# Patient Record
Sex: FEMALE | Employment: UNEMPLOYED | URBAN - METROPOLITAN AREA
[De-identification: names, ages, dates, MRNs, and addresses within clinical notes are randomized per-mention and may not be internally consistent; named-entity substitution may affect disease eponyms.]

---

## 2018-03-22 ENCOUNTER — OFFICE VISIT (OUTPATIENT)
Dept: FAMILY MEDICINE CLINIC | Age: 28
End: 2018-03-22

## 2018-03-22 ENCOUNTER — HOSPITAL ENCOUNTER (OUTPATIENT)
Dept: LAB | Age: 28
Discharge: HOME | End: 2018-03-22

## 2018-03-22 VITALS
SYSTOLIC BLOOD PRESSURE: 104 MMHG | HEART RATE: 68 BPM | DIASTOLIC BLOOD PRESSURE: 70 MMHG | WEIGHT: 124 LBS | BODY MASS INDEX: 23.41 KG/M2 | HEIGHT: 61 IN | TEMPERATURE: 98.1 F | RESPIRATION RATE: 15 BRPM | OXYGEN SATURATION: 99 %

## 2018-03-22 DIAGNOSIS — E03.9 ACQUIRED HYPOTHYROIDISM: ICD-10-CM

## 2018-03-22 DIAGNOSIS — N92.6 IRREGULAR MENSES: ICD-10-CM

## 2018-03-22 DIAGNOSIS — E03.9 ACQUIRED HYPOTHYROIDISM: Primary | ICD-10-CM

## 2018-03-22 DIAGNOSIS — Z00.00 REGULAR CHECK-UP: ICD-10-CM

## 2018-03-22 LAB
BASOPHILS # BLD: 0 K/UL (ref 0–0.06)
BASOPHILS NFR BLD: 0 % (ref 0–2)
DIFFERENTIAL METHOD BLD: NORMAL
EOSINOPHIL # BLD: 0.1 K/UL (ref 0–0.4)
EOSINOPHIL NFR BLD: 1 % (ref 0–5)
ERYTHROCYTE [DISTWIDTH] IN BLOOD BY AUTOMATED COUNT: 13.3 % (ref 11.6–14.5)
HCT VFR BLD AUTO: 38.9 % (ref 35–45)
HGB BLD-MCNC: 12.5 G/DL (ref 12–16)
LYMPHOCYTES # BLD: 2.3 K/UL (ref 0.9–3.6)
LYMPHOCYTES NFR BLD: 28 % (ref 21–52)
MCH RBC QN AUTO: 28.2 PG (ref 24–34)
MCHC RBC AUTO-ENTMCNC: 32.1 G/DL (ref 31–37)
MCV RBC AUTO: 87.6 FL (ref 74–97)
MONOCYTES # BLD: 0.5 K/UL (ref 0.05–1.2)
MONOCYTES NFR BLD: 7 % (ref 3–10)
NEUTS SEG # BLD: 5 K/UL (ref 1.8–8)
NEUTS SEG NFR BLD: 64 % (ref 40–73)
PLATELET # BLD AUTO: 225 K/UL (ref 135–420)
PMV BLD AUTO: 11.4 FL (ref 9.2–11.8)
RBC # BLD AUTO: 4.44 M/UL (ref 4.2–5.3)
TSH SERPL DL<=0.05 MIU/L-ACNC: 2.92 UIU/ML (ref 0.36–3.74)
WBC # BLD AUTO: 7.9 K/UL (ref 4.6–13.2)

## 2018-03-22 PROCEDURE — 84443 ASSAY THYROID STIM HORMONE: CPT | Performed by: FAMILY MEDICINE

## 2018-03-22 PROCEDURE — 85025 COMPLETE CBC W/AUTO DIFF WBC: CPT | Performed by: FAMILY MEDICINE

## 2018-03-22 RX ORDER — LEVOTHYROXINE SODIUM 50 UG/1
TABLET ORAL
COMMUNITY

## 2018-03-22 NOTE — PROGRESS NOTES
Discharge instructions reviewed with patient    Medication list and understanding of medications reviewed with patient. OTC and herbal medications reviewed and added to med list if applicable  Barriers to adherence assessed. Guidance given regarding new medications this visit, including reason for taking this medicine, and common side effects. Labs drawn. Appointment for OB/GYN consult made. Interpretor Brayan Ronquillo MA utilized with this patient.

## 2018-03-22 NOTE — PROGRESS NOTES
YESICA  Zeenat Clifford is a 33 y.o. female being seen today for   Chief Complaint   Patient presents with    Menstrual Problem     Pt stated she has been on her menstrual cycle for 2 months. .  she states that she has menstural bleeding for about 2 months. She does have history of irregular menses but has never happened like this before. Some mild cramping. A little dizzy. Started menses at 16,  Was pregnant at 24 but had incomplete miscarriage, and had D and C and transfusion. Was on OCP breifly following her miscarriage, then stopped so she could get pregnant again. She did get pregnant and had a normal pregnancy and delivery. Now has a 3year old. Her last pelvic exam was after her son was born. Does have mild hypothyroidism [facility] [place] [facility]      Past Medical History:   Diagnosis Date    Acquired hypothyroidism 3/22/2018    History of blood transfusion     2011    History of D&C     2011         ROS  Patient states that she is feeling well. Denies complaints of chest pain, shortness of breath, swelling of legs. she denies nausea, vomiting or diarrhea. Current Outpatient Prescriptions   Medication Sig    levothyroxine (SYNTHROID) 50 mcg tablet Take  by mouth Daily (before breakfast).  desogestrel-ethinyl estradiol (DESOGEN) 0.15-30 mg-mcg per tablet Take 1 Tab by mouth daily.  norgestimate-ethinyl estradiol (SPRINTEC, 28,) 0.25-35 mg-mcg per tablet Take 1 Tab by mouth daily.  PNV Comb 46-Iron Cb-FA-DHA (PRENATAL PLUS DHA) 7-400-100 mg-mcg-mg Tab Take 1 Tab by mouth daily. No current facility-administered medications for this visit. PE  Visit Vitals    /70 (BP 1 Location: Left arm, BP Patient Position: Sitting)    Pulse 68    Temp 98.1 °F (36.7 °C) (Oral)    Resp 15    Ht 5' 1\" (1.549 m)    Wt 124 lb (56.2 kg)    LMP 03/22/2018    SpO2 99%    BMI 23.43 kg/m2        Alert and oriented with normal mood and affect.  she is well developed and well nourished . Lungs are clear without wheezing. Heart rate is regular without murmurs or gallops. There is no lower extremity edema. Results for orders placed or performed in visit on 11/14/12   CBC WITH AUTOMATED DIFF   Result Value Ref Range    WBC 7.3 4.0 - 10.5 x10E3/uL    RBC 4.53 3.77 - 5.28 x10E6/uL    HGB 12.1 11.1 - 15.9 g/dL    HCT 37.8 34.0 - 46.6 %    MCV 83 79 - 97 fL    MCH 26.7 26.6 - 33.0 pg    MCHC 32.0 31.5 - 35.7 g/dL    RDW 13.9 12.3 - 15.4 %    PLATELET 724 476 - 874 x10E3/uL    NEUTROPHILS 50 40 - 74 %    Lymphocytes 39 14 - 46 %    MONOCYTES 9 4 - 13 %    EOSINOPHILS 1 0 - 7 %    BASOPHILS 1 0 - 3 %    ABS. NEUTROPHILS 3.7 1.8 - 7.8 x10E3/uL    Abs Lymphocytes 2.8 0.7 - 4.5 x10E3/uL    ABS. MONOCYTES 0.7 0.1 - 1.0 x10E3/uL    ABS. EOSINOPHILS 0.1 0.0 - 0.4 x10E3/uL    ABS. BASOPHILS 0.0 0.0 - 0.2 x10E3/uL    IMMATURE GRANULOCYTES 0 0 - 2 %    ABS. IMM. GRANS. 0.0 0.0 - 0.1 x10E3/uL   TSH REFLEX TO T4   Result Value Ref Range    TSH 2.600 0.450 - 4.500 uIU/mL   AMB POC URINALYSIS DIP STICK MANUAL W/O MICRO   Result Value Ref Range    Color (UA POC) Yellow (none)    Clarity (UA POC) Clear (none)    Glucose (UA POC) Negative (none)    Bilirubin (UA POC) Negative (none)    Ketones (UA POC) Negative (none)    Specific gravity (UA POC) 6.500 (A) 1.001 - 1.035    Blood (UA POC) 2+ (none)    pH (UA POC) 1.015 (A) 4.6 - 8.0    Protein (UA POC) n Negative mg/dL    Urobilinogen (UA POC) 0.2 mg/dL 0.2 - 1    Nitrites (UA POC) Negative (none)    Leukocyte esterase (UA POC) Negative (none)         Assessment and Plan:        ICD-10-CM ICD-9-CM    1. Acquired hypothyroidism E03.9 244.9 TSH 3RD GENERATION   2. Irregular menses N92.6 626.4 CBC WITH AUTOMATED DIFF      AMB POC URINE PREGNANCY TEST, VISUAL COLOR COMPARISON   3. Regular check-up Z00.00 V70.0      Check labs today.  (upreg, TSH, CBC)    She likely needs to go back on OCP to regulate menses but pt is uncomfortable taking rx without pelvic exam and we do not have facility for exam today  Will refer back to gyn where she was in the past.   She is comfortable with that plan      Sidney Pisano MD

## 2018-03-23 ENCOUNTER — OFFICE VISIT (OUTPATIENT)
Dept: OBGYN CLINIC | Age: 28
End: 2018-03-23

## 2018-03-23 ENCOUNTER — HOSPITAL ENCOUNTER (OUTPATIENT)
Dept: LAB | Age: 28
Discharge: HOME | End: 2018-03-23

## 2018-03-23 VITALS
HEIGHT: 61 IN | BODY MASS INDEX: 23.79 KG/M2 | OXYGEN SATURATION: 100 % | DIASTOLIC BLOOD PRESSURE: 82 MMHG | SYSTOLIC BLOOD PRESSURE: 128 MMHG | RESPIRATION RATE: 18 BRPM | HEART RATE: 78 BPM | WEIGHT: 126 LBS

## 2018-03-23 DIAGNOSIS — N94.6 DYSMENORRHEA: ICD-10-CM

## 2018-03-23 DIAGNOSIS — N92.1 MENOMETRORRHAGIA: Primary | ICD-10-CM

## 2018-03-23 PROCEDURE — 84146 ASSAY OF PROLACTIN: CPT | Performed by: OBSTETRICS & GYNECOLOGY

## 2018-03-23 PROCEDURE — 36415 COLL VENOUS BLD VENIPUNCTURE: CPT | Performed by: OBSTETRICS & GYNECOLOGY

## 2018-03-23 PROCEDURE — 83001 ASSAY OF GONADOTROPIN (FSH): CPT | Performed by: OBSTETRICS & GYNECOLOGY

## 2018-03-23 PROCEDURE — 82670 ASSAY OF TOTAL ESTRADIOL: CPT | Performed by: OBSTETRICS & GYNECOLOGY

## 2018-03-23 NOTE — PROGRESS NOTES
Subjective:      Patient is Chinese speaking only and the visit was accomplished with the aid of a phone . Patient states that she has had frequent and irregular vaginal bleeding since a miscarriage 6 years ago, but that for the past 3 months she has had almost constant bleeding. She also complains of pain with the bleeding. She has had one other pregnancy with a vaginal delivery 3 years ago. She states that she is not currently sexually active. Current Outpatient Prescriptions   Medication Sig Dispense Refill    levothyroxine (SYNTHROID) 50 mcg tablet Take  by mouth Daily (before breakfast). No Known Allergies  Past Medical History:   Diagnosis Date    Acquired hypothyroidism 3/22/2018    History of blood transfusion     2011    History of D&C     2011     History reviewed. No pertinent surgical history. Family History   Problem Relation Age of Onset    Hypertension Mother      Social History   Substance Use Topics    Smoking status: Never Smoker    Smokeless tobacco: Never Used    Alcohol use No      Review of Systems    A comprehensive review of systems was negative except for that written in the HPI. Objective:     @IPVITALS(8:6,8,9,5,10)@  : Body mass index is 23.81 kg/(m^2).   Visit Vitals    /82 (BP 1 Location: Left arm, BP Patient Position: Sitting)    Pulse 78    Resp 18    Ht 5' 1\" (1.549 m)    Wt 126 lb (57.2 kg)    LMP 12/19/2017    SpO2 100%    BMI 23.81 kg/m2     General appearance: alert, cooperative, no distress, appears stated age  Head: Normocephalic, without obvious abnormality, atraumatic  Lungs: normal respiratory effort  Pelvic: External genitalia normal, Vagina normal without discharge, cervix normal in appearance, no CMT, uterus normal size, shape, and consistency, no adnexal masses or tenderness, rectovaginal septum normal  Extremities: extremities normal, atraumatic, no cyanosis or edema  Skin: Skin color, texture, turgor normal. No rashes or lesions  Neurologic: Grossly normal       Assessment/Plan:     Menometrorrhagia, dysmenorrhea. Pelvic ultrasound and labs pending. Follow up after ultrasound. Plan of care discussed. Patient expressed understanding.

## 2018-03-24 LAB — ESTRADIOL SERPL-MCNC: 43.2 PG/ML

## 2018-03-28 LAB
FSH SERPL-ACNC: 6 MIU/ML
LH SERPL-ACNC: 29.3 MIU/ML
PROLACTIN SERPL-MCNC: 13 NG/ML

## 2018-04-06 ENCOUNTER — HOSPITAL ENCOUNTER (OUTPATIENT)
Dept: ULTRASOUND IMAGING | Age: 28
Discharge: HOME | End: 2018-04-06
Attending: OBSTETRICS & GYNECOLOGY
Payer: SELF-PAY

## 2018-04-06 DIAGNOSIS — N92.1 MENOMETRORRHAGIA: ICD-10-CM

## 2018-04-06 DIAGNOSIS — N94.6 DYSMENORRHEA: ICD-10-CM

## 2018-04-06 PROCEDURE — 76830 TRANSVAGINAL US NON-OB: CPT

## 2018-04-19 ENCOUNTER — OFFICE VISIT (OUTPATIENT)
Dept: OBGYN CLINIC | Age: 28
End: 2018-04-19

## 2018-04-19 VITALS
OXYGEN SATURATION: 100 % | SYSTOLIC BLOOD PRESSURE: 119 MMHG | HEIGHT: 61 IN | DIASTOLIC BLOOD PRESSURE: 78 MMHG | BODY MASS INDEX: 24.17 KG/M2 | HEART RATE: 86 BPM | WEIGHT: 128 LBS | RESPIRATION RATE: 18 BRPM

## 2018-04-19 DIAGNOSIS — N92.1 MENOMETRORRHAGIA: Primary | ICD-10-CM

## 2018-04-19 NOTE — PROGRESS NOTES
Subjective:      Patient presents for follow up of labs and ultrasound done secondary to menometrorrhagia. She is Serbian speaking and the visit was accomplished with the aid of a phone . The patient states that she has had only 2-3 days without bleeding since her last visit, and that this is the pattern which has been present for the past few months. She also complains of hair loss. Current Outpatient Prescriptions   Medication Sig Dispense Refill    levothyroxine (SYNTHROID) 50 mcg tablet Take  by mouth Daily (before breakfast). No Known Allergies  Past Medical History:   Diagnosis Date    Acquired hypothyroidism 3/22/2018    History of blood transfusion     2011    History of D&C     2011     History reviewed. No pertinent surgical history. Family History   Problem Relation Age of Onset    Hypertension Mother      Social History   Substance Use Topics    Smoking status: Never Smoker    Smokeless tobacco: Never Used    Alcohol use No      Review of Systems    A comprehensive review of systems was negative except for that written in the HPI. Objective:     Visit Vitals    /78 (BP 1 Location: Left arm, BP Patient Position: Sitting)    Pulse 86    Resp 18    Ht 5' 1\" (1.549 m)    Wt 128 lb (58.1 kg)    LMP 03/22/2018    SpO2 100%    BMI 24.19 kg/m2     General appearance: alert, cooperative, no distress, appears stated age  Exam deferred    Pelvic ultrasound and labs reviewed: FSH, LH, estradiol, and prolactin all normal. TSH drawn by another provider and was normal. Ultrasound unremarkable. Assessment/Plan:     Menometrorrhagia. Recommended that the patient return for an endometrial biopsy. Discussed the option of symptomatic treatment with contraceptives, and the patient states that she would rather continue to observe for now. Follow up for procedure. Plan of care discussed. Patient expressed understanding.     The entirety of the 20 minute visit was spent in counseling.

## 2018-05-04 ENCOUNTER — OFFICE VISIT (OUTPATIENT)
Dept: OBGYN CLINIC | Age: 28
End: 2018-05-04

## 2018-05-04 ENCOUNTER — HOSPITAL ENCOUNTER (OUTPATIENT)
Dept: LAB | Age: 28
Discharge: HOME | End: 2018-05-04
Payer: SELF-PAY

## 2018-05-04 VITALS
HEIGHT: 64 IN | DIASTOLIC BLOOD PRESSURE: 61 MMHG | HEART RATE: 77 BPM | BODY MASS INDEX: 21.51 KG/M2 | WEIGHT: 126 LBS | RESPIRATION RATE: 18 BRPM | SYSTOLIC BLOOD PRESSURE: 112 MMHG

## 2018-05-04 DIAGNOSIS — N92.1 MENOMETRORRHAGIA: Primary | ICD-10-CM

## 2018-05-04 LAB
HCG URINE, QL. (POC): NEGATIVE
VALID INTERNAL CONTROL?: YES

## 2018-05-04 PROCEDURE — 88305 TISSUE EXAM BY PATHOLOGIST: CPT | Performed by: OBSTETRICS & GYNECOLOGY

## 2018-05-04 NOTE — PROGRESS NOTES
Patient here for EMB only secondary to prolonged vaginal bleeding following amenorrhea. Visit was accomplished with the aid of a phone . She states that her bleeding has now resolved. See procedure note.

## 2018-05-04 NOTE — PROCEDURES
[facility] OB/GYN  OFFICE PROCEDURE PROGRESS NOTE        Chart reviewed for the following:   Silke CRAWFORD DO, have reviewed the History, Physical and updated the Allergic reactions for Zeenat Kaye Camelia     TIME OUT performed immediately prior to start of procedure:   Silke CRAWFORD DO, have performed the following reviews on Zeenat Kaye Camelia prior to the start of the procedure:            * Patient was identified by name and date of birth   * Agreement on procedure being performed was verified  * Risks and Benefits explained to the patient  * Procedure site verified and marked as necessary  * Patient was positioned for comfort  * Consent was signed and verified     Time: 1130    Date of procedure: 5/4/2018    Procedure performed by:  Silke Sin DO    Provider assisted by: Elkin Arenas LPN    Patient assisted by: self    How tolerated by patient: tolerated the procedure well with no complications    Post Procedural Pain Scale: not reported    Comments: none    Endometrial Biopsy Procedure Note    Endometrial biopsy was performed today. Indications: abnormal uterine bleeding    Procedure Details   Urine pregnancy test was done and result is negative. The risks (including infection, bleeding, pain, and uterine perforation) and benefits of the procedure were explained to the her and written informed consent was obtained. Antibiotic prophylaxis against endocarditis was not indicated. She was placed in the dorsal lithotomy position. Bimanual exam showed the uterus to be in the neutral position. A speculum inserted in the vagina, and the cervix prepped with povidone iodine. A \"Pipelle endometrial aspirator\" was used to sample the endometrium. Sample was sent for pathologic examination. The patient tolerated the procedure well and there were no complications. Plan:  Ms. Shay Anguiano was advised to call for any fever or for prolonged or severe pain or bleeding.  She was advised to use OTC ibuprofen as needed for mild to moderate pain. She was advised to avoid vaginal intercourse for 48 hours or until the bleeding has completely stopped. My office will get in touch with her as soon as we have the pathology report.

## 2018-05-30 NOTE — PROGRESS NOTES
2nd call to patient, LMOVM, please call to discuss test results.  No additional details given  ltr also mtp

## 2018-08-24 ENCOUNTER — OFFICE VISIT (OUTPATIENT)
Dept: OBGYN CLINIC | Age: 28
End: 2018-08-24

## 2018-08-24 VITALS
SYSTOLIC BLOOD PRESSURE: 117 MMHG | DIASTOLIC BLOOD PRESSURE: 78 MMHG | WEIGHT: 128 LBS | HEIGHT: 64 IN | RESPIRATION RATE: 18 BRPM | BODY MASS INDEX: 21.85 KG/M2 | HEART RATE: 85 BPM | OXYGEN SATURATION: 98 %

## 2018-08-24 DIAGNOSIS — N92.1 MENOMETRORRHAGIA: Primary | ICD-10-CM

## 2018-08-24 RX ORDER — NORGESTIMATE AND ETHINYL ESTRADIOL 0.25-0.035
1 KIT ORAL DAILY
Qty: 3 PACKAGE | Refills: 3 | Status: SHIPPED | OUTPATIENT
Start: 2018-08-24

## 2018-08-24 NOTE — PROGRESS NOTES
Subjective:      Patient is Chinese speaking only and the visit was accomplished with the aid of a phone . Patient presents to review the results of her EMB and ultrasound. She also complains of continued irregular bleeding, with some bleeding present on about half of the days. She says that this is unchanged from her previous pattern. Current Outpatient Prescriptions   Medication Sig Dispense Refill    norgestimate-ethinyl estradiol (ORTHO-CYCLEN, SPRINTEC) 0.25-35 mg-mcg tab Take 1 Tab by mouth daily. 3 Package 3    levothyroxine (SYNTHROID) 50 mcg tablet Take  by mouth Daily (before breakfast). No Known Allergies  Past Medical History:   Diagnosis Date    Acquired hypothyroidism 3/22/2018    History of blood transfusion     2011    History of D&C     2011     History reviewed. No pertinent surgical history. Family History   Problem Relation Age of Onset    Hypertension Mother      Social History   Substance Use Topics    Smoking status: Never Smoker    Smokeless tobacco: Never Used    Alcohol use No      Review of Systems    A comprehensive review of systems was negative except for that written in the HPI. Objective:     Visit Vitals    /78    Pulse 85    Resp 18    Ht 5' 4\" (1.626 m)    Wt 128 lb (58.1 kg)    LMP 08/07/2018    SpO2 98%    BMI 21.97 kg/m2     General appearance: alert, cooperative, no distress, appears stated age  Exam deferred. EMB and pelvic ultrasound reviewed and discussed:  EMB benign  Ultrasound normal with the exception of multiple peripheral ovarian follicles bilaterally. Assessment/Plan:     Menometrorrhagia. Discussed with the patient that her ultrasound is suspicious for PCOS, though she does not meet the full diagnostic criteria. Discussed with her that all other test have been normal including Pap and labs.  Discussed that the first-line treatment for her bleeding is the use of a contraceptive to regulate her bleeding, and she agrees to try OCPs. Rx written and instructions given. Follow up prn. Plan of care discussed. Patient expressed understanding. The entirety of the 30 minute visit was spent in counseling.

## 2018-08-24 NOTE — MR AVS SNAPSHOT
[address] [place] [address] [id number] 
[phone] Patient: Alan Hernandez MRN: [id number] UTX:[id number] Visit Information La Menezes y Amauri Personal Médico Departamento Teléfono del Dep. Número de visita 8/24/2018  9:45 AM Karan Montes DO [facility] [phone] [phone] Upcoming Health Maintenance Date Due  
 PAP AKA CERVICAL CYTOLOGY 9/29/2014 Influenza Age 5 to Adult 8/1/2018 Alergias  Review Complete El: 5/4/2018 Por: Karan Montes DO A partir del:  8/24/2018 No Known Allergies Vacunas actuales Neema Maxon No hay ninguna vacuna archivada. No revisadas esta visita Partes vitales PS Pulso Resp Premier ( percentil de crecimiento) Peso (percentil de crecimiento) LMP (última kusum)  
 117/78 85 18 5' 4\" (1.626 m) 128 lb (58.1 kg) 08/07/2018 SpO2 BMI ([facility]) Estado obstétrico Estatus de tabaquísmo 98% 21.97 kg/m2 Unknown Never Smoker Historial de signos vitales BMI and BSA Data Body Mass Index Body Surface Area  
 21.97 kg/m 2 1.62 m 2 [facility] Name Phone [employer] [address] [phone] Puga lista de medicamentos actualizada Andreas Alfonso actualizada 8/24/18 10:32 AM.  Elbridge Moises use puga lista de medicamentos más reciente. levothyroxine 50 mcg tablet También conocido krystal:  SYNTHROID Take  by mouth Daily (before breakfast). Instrucciones para el Pac[place] Síndrome de ovario poliquístico: Instrucciones de cuidado - [ Polycystic Ovary Syndrome: Care Instructions ] Instrucciones de cuidado El síndrome de ovario poliquístico (PCOS, por alberto siglas en inglés) significa que las hormonas de luana tomi están desequilibradas. Puede causar problemas con alberto períodos y hacer que sea difícil Raheem Blinks. Los médicos no saben con seguridad qué causa el PCOS, aunque parece ser hereditario. También parece estar relacionado con la obesidad y el riesgo de diabetes. Si usted tiene PCOS, alberto hermanas e hijas tienen un mayor riesgo de tenerlo [place]. Puede tener otros síntomas. Estos incluyen aumento de [place], acné, crecimiento excesivo de vello en la ino o el cuerpo, presión arterial rosina y azúcar alto en la bryan. Alberto ovarios pueden tener quistes. Estos quistes son crecimientos llenos de líquido. Tenga en cuenta que aunque alberto períodos menstruales maryse irregulares, de todos modos, podría quedar [place]. Hable con bill médico acerca de métodos anticonceptivos si no quiere quedar embarazada. En ocasiones, los cambios hormonales que se manifiestan con el PCOS también pueden dificultar que algunas mujeres queden [place]. Si esto le preocupa, hable con bill médico acerca de un tratamiento para sherie problema. Las mujeres que tienen PCOS pueden pasar meses o incluso más tiempo sin períodos. Es posible que bill médico le recomiende [employer] que pueden ayudar a regularizar alberto ciclos. La atención de seguimiento es luana parte clave de bill tratamiento y seguridad. Asegúrese de hacer y acudir a todas las citas, y llame a bill médico si está teniendo problemas. También es luana buena idea saber los resultados de alberto exámenes y mantener luana lista de los medicamentos que dhara. Cómo puede cuidarse en el hogar? · [employer] medicamentos valdo krystal le fueron recetados. Llame a bill médico si puja estar teniendo un problema con bill medicamento. · Siga luana dieta saludable. Incluya en bill dieta diaria frutas, verduras, frijoles (habichuelas) y granos integrales. · Si tiene sobrepeso, adelgazar puede ayudar con muchos de los síntomas del PCOS. Consulte a bill médico sobre [address] seguras de bajar de [place].  
· Jose por lo menos 30 minutos de ejercicio la mayoría de los días de la semana. Caminar es luana buena opción. O puede correr, nadar, [employer], o jugar al tenis u otros deportes de equipo. · Para el vello indeseable, pruebe con decoloración, depilación, electrólisis o tratamiento con láser. · El acné puede tratarse con medicamentos de [employer]. Busque aquellos que contengan peróxido de benzoilo o ácido salicílico. 

Cuándo debes pedir ayuda? Vigila muy de cerca los cambios en tu darryl, y asegúrate de comunicarte con tu médico si: 
  · Tus síntomas Michelle Bold. Dónde puede encontrar más información en inglés? Patricia Tirado a [email or web address] Rosalynd Life D972 en la búsqueda para aprender más acerca de \"Síndrome de ovario poliquístico: Instrucciones de cuidado - [ Polycystic Ovary Syndrome: Care Instructions ]. \" 
Revisado: 6 octubre, 2017 Versión del contenido: 11.7 © 9996-5086 Healthwise, Incorporated. Las instrucciones de cuidado fueron adaptadas bajo licencia por [employer] (which disclaims liability or warranty for this information). Si usted tiene [place] afección médica o sobre estas instrucciones, siempre pregunte a bill profesional de darryl. Healthwise, Incorporated niega toda garantía o responsabilidad por bill uso de esta información. Introducing \[facility] [place] [facility]\"" & HEALTH SERVICES! [facility] introduce portal paciente MyChart . Ahora se puede acceder a partes de bill expediente médico, enviar por correo electrónico la oficina de bill médico y solicitar renovaciones de medicamentos en línea. En bill navegador de Internet , Bryan Flavors a [email or web address] Jose clic en el usuario por Ana Chrissy? Wade Speed clic aquí en la sesión Lisset Nash. Verá la página de registro [place]. Ingrese bill código de [facility] valdo y krystal aparece a continuación. Janette no tendrá que [employer] código después de lucius completado el proceso de registro .  Si janette no se inscribe antes de la fecha de caducidad , debe solicitar un nuevo código. · MyChart Código de acceso : [id number] Expires: 11/22/2018  9:47 AM 
 
Ingresa los últimos cuatro dígitos de bill Número de Seguro Social ( xxxx ) y fecha de nacimiento ( dd / mm / aaaa ) krystal se indica y jose clic en Enviar. Usted será llevado a la siguiente página de registro . Crear un ID MyChart . Esta será bill ID de inicio de sesión de MyChart y no puede ser [place] , por lo que pensar en luana que es Javier Muckle y fácil de recordar . Crear luana contraseña MyChart . Usted puede cambiar bill contraseña en cualquier momento . Ingrese bill Password Reset de preguntas y [place] . Friendly se puede utilizar en un momento posterior si usted olvida bill contraseña. Introduzca bill dirección de correo electrónico . Felix Sit recibirá luana notificación por correo electrónico cuando la nueva información está disponible en MyChart . Valora Jonny clic en Registrarse. Gena Sermon jena y descargar porciones de bill expediente médico. 
Jose clic en el enlace de descarga del menú Resumen para descargar luana copia portátil de bill información médica . Si tiene [employer] , por favor visite la sección de preguntas frecuentes del sitio web MyChart . Recuerde, MyChart NO es que se utilizará para las necesidades urgentes. Para emergencias médicas , llame al 911 . Ahora disponible en bill iPhone y Android ! Por favor proporcione sherie resumen de la documentación de cuidado a bill próximo proveedor. Your primary care clinician is listed as Chelsea Mcgraw. If you have any questions after today's visit, please call [phone].

## 2018-08-24 NOTE — PATIENT INSTRUCTIONS
Síndrome de ovario poliquístico: Instrucciones de cuidado - [ Polycystic Ovary Syndrome: Care Instructions ]  Instrucciones de cuidado  El síndrome de ovario poliquístico (PCOS, por karmen siglas en inglés) significa que las hormonas de luana tomi están desequilibradas. Puede causar problemas con karmen períodos y hacer que sea difícil Echo Motts. Los médicos no saben con seguridad qué causa el PCOS, aunque parece ser hereditario. También parece estar relacionado con la obesidad y el riesgo de diabetes. Si usted tiene PCOS, karmen hermanas e hijas tienen un mayor riesgo de tenerlo [place]. Puede tener otros síntomas. Estos incluyen aumento de [place], acné, crecimiento excesivo de vello en la ino o el cuerpo, presión arterial rosina y azúcar alto en la bryan. Karmen ovarios pueden tener quistes. Estos quistes son crecimientos llenos de líquido. Tenga en cuenta que aunque karmen períodos menstruales maryse irregulares, de todos modos, podría quedar [place]. Hable con bill médico acerca de métodos anticonceptivos si no quiere quedar embarazada. En ocasiones, los cambios hormonales que se manifiestan con el PCOS también pueden dificultar que algunas mujeres queden [place]. Si esto le preocupa, hable con bill médico acerca de un tratamiento para sherie problema. Las mujeres que tienen PCOS pueden pasar meses o incluso más tiempo sin períodos. Es posible que bill médico le recomiende [employer] que pueden ayudar a regularizar karmen ciclos. La atención de seguimiento es luana parte clave de bill tratamiento y seguridad. Asegúrese de hacer y acudir a todas las citas, y llame a bill médico si está teniendo problemas. También es luana buena idea saber los resultados de karmen exámenes y mantener luana lista de los medicamentos que dhara. ¿Cómo puede cuidarse en el hogar? · [employer] medicamentos valdo krystal le fueron recetados. Llame a bill médico si puja estar teniendo un problema con bill medicamento. · Siga luana dieta saludable.  Bolivar Harms en bill dieta diaria frutas, verduras, frijoles (habichuelas) y granos integrales. · Si tiene sobrepeso, adelgazar puede ayudar con muchos de los síntomas del PCOS. Consulte a bill médico sobre [address] seguras de bajar de [place]. · Jose por lo menos 30 minutos de ejercicio la mayoría de los días de la [place]. Caminar es luana buena opción. O puede correr, nadar, [employer], o jugar al tenis u otros deportes de equipo. · Para el vello indeseable, pruebe con decoloración, depilación, electrólisis o tratamiento con láser. · El acné puede tratarse con medicamentos de [employer]. Busque aquellos que contengan peróxido de benzoilo o ácido salicílico.  ¿Cuándo debes pedir ayuda? Vigila muy de cerca los cambios en tu darryl, y asegúrate de comunicarte con tu médico si:    · Tus síntomas [employer]. ¿Dónde puede encontrar más información en inglés? Raven Stafford a [email or web address] Marsha Marie [id number] en la búsqueda para aprender más acerca de \"Síndrome de ovario poliquístico: Instrucciones de cuidado - [ Polycystic Ovary Syndrome: Care Instructions ]. \"  Revisado: 6 octubre, 2017  Versión del contenido: 11.7  © 2024-0245 [employer]. Las instrucciones de cuidado fueron adaptadas bajo licencia por [employer] (which disclaims liability or warranty for this information). Si usted tiene [place] afección médica o sobre estas instrucciones, siempre pregunte a blil profesional de darryl. [employer] niega toda garantía o responsabilidad por bill uso de esta información.